# Patient Record
Sex: FEMALE | Race: WHITE | Employment: FULL TIME | URBAN - METROPOLITAN AREA
[De-identification: names, ages, dates, MRNs, and addresses within clinical notes are randomized per-mention and may not be internally consistent; named-entity substitution may affect disease eponyms.]

---

## 2017-01-11 PROCEDURE — 88175 CYTOPATH C/V AUTO FLUID REDO: CPT | Performed by: OBSTETRICS & GYNECOLOGY

## 2017-01-11 PROCEDURE — 87624 HPV HI-RISK TYP POOLED RSLT: CPT | Performed by: OBSTETRICS & GYNECOLOGY

## 2017-02-27 PROCEDURE — 88305 TISSUE EXAM BY PATHOLOGIST: CPT | Performed by: OBSTETRICS & GYNECOLOGY

## 2017-02-28 PROBLEM — M76.51 PATELLAR TENDINITIS, RIGHT KNEE: Status: ACTIVE | Noted: 2017-02-28

## 2017-02-28 PROBLEM — M25.561 ANTERIOR KNEE PAIN, RIGHT: Status: ACTIVE | Noted: 2017-02-28

## 2017-03-08 ENCOUNTER — HOSPITAL ENCOUNTER (EMERGENCY)
Facility: HOSPITAL | Age: 42
Discharge: HOME | End: 2017-03-08
Payer: COMMERCIAL

## 2017-03-08 VITALS
DIASTOLIC BLOOD PRESSURE: 67 MMHG | SYSTOLIC BLOOD PRESSURE: 109 MMHG | HEART RATE: 77 BPM | BODY MASS INDEX: 24 KG/M2 | RESPIRATION RATE: 20 BRPM | WEIGHT: 145 LBS | TEMPERATURE: 98 F | OXYGEN SATURATION: 97 %

## 2017-03-08 DIAGNOSIS — H92.01 OTALGIA, RIGHT: ICD-10-CM

## 2017-03-08 DIAGNOSIS — H61.21 IMPACTED CERUMEN OF RIGHT EAR: Primary | ICD-10-CM

## 2017-03-08 PROCEDURE — 99283 EMERGENCY DEPT VISIT LOW MDM: CPT

## 2017-03-08 NOTE — ED PROVIDER NOTES
Patient Seen in: [facility] Emergency Department    History   Patient presents with:  Ear Problem Pain (neurosensory)    Stated Complaint: R ear pain    HPI    Patient is a 80-year-old presenting to the ER with complaint of sudden onset right ear pa Comment Performed by Miriam Solis at [facility]  1/2015    Comment LEAH 1    COLPOSCOPY, CERVIX W/UPPER ADJACENT VAGINA; W/ENDOCERVICAL CURETTAGE  6/2015    Comment LEAH 1    COLPOSCOPY, CERVIX W/UPPER ADJACENT VAGINA; W/BIOPSY(S), (36.8 °C)   Temp src 03/08/17 0031 Temporal   SpO2 03/08/17 0031 97 %   O2 Device 03/08/17 0031 None (Room air)       Current:/67 mmHg  Pulse 77  Temp(Src) 98.3 °F (36.8 °C) (Temporal)  Resp 20  Wt 65.772 kg  SpO2 97%  LMP 02/16/2017        Physical patient's immediate return to the emergency department. Reasonable over the counter and prescription treatment options and Physician follow up plan was discussed. The patient is discharged home in good condition.              Disposition and Plan     C

## 2017-04-18 PROBLEM — E88.89 HOFFA'S FAT PAD DISEASE (HCC): Status: ACTIVE | Noted: 2017-04-18

## 2017-04-18 PROBLEM — M22.41 CHONDROMALACIA PATELLAE, RIGHT: Status: ACTIVE | Noted: 2017-04-18

## 2017-06-23 PROCEDURE — 36415 COLL VENOUS BLD VENIPUNCTURE: CPT | Performed by: FAMILY MEDICINE

## 2017-06-23 PROCEDURE — 87389 HIV-1 AG W/HIV-1&-2 AB AG IA: CPT | Performed by: FAMILY MEDICINE

## 2017-06-23 PROCEDURE — 86803 HEPATITIS C AB TEST: CPT | Performed by: FAMILY MEDICINE

## 2017-06-23 PROCEDURE — 86592 SYPHILIS TEST NON-TREP QUAL: CPT | Performed by: FAMILY MEDICINE

## 2017-06-23 PROCEDURE — 87491 CHLMYD TRACH DNA AMP PROBE: CPT | Performed by: FAMILY MEDICINE

## 2017-06-23 PROCEDURE — 87591 N.GONORRHOEAE DNA AMP PROB: CPT | Performed by: FAMILY MEDICINE

## 2018-02-01 PROCEDURE — 88175 CYTOPATH C/V AUTO FLUID REDO: CPT | Performed by: OBSTETRICS & GYNECOLOGY

## 2018-02-01 PROCEDURE — 87624 HPV HI-RISK TYP POOLED RSLT: CPT | Performed by: OBSTETRICS & GYNECOLOGY

## 2019-03-05 PROCEDURE — 88175 CYTOPATH C/V AUTO FLUID REDO: CPT | Performed by: OBSTETRICS & GYNECOLOGY

## 2019-03-05 PROCEDURE — 87624 HPV HI-RISK TYP POOLED RSLT: CPT | Performed by: OBSTETRICS & GYNECOLOGY

## 2020-04-13 ENCOUNTER — HOSPITAL ENCOUNTER (OUTPATIENT)
Dept: BEHAVIORAL HEALTH | Age: 45
Discharge: STILL ADMITTED OR ADMINISTRATIVE | End: 2020-04-13
Attending: PSYCHIATRY & NEUROLOGY

## 2020-04-13 DIAGNOSIS — F41.1 GENERALIZED ANXIETY DISORDER: Primary | ICD-10-CM

## 2020-04-13 DIAGNOSIS — F33.42 MAJOR DEPRESSIVE DISORDER, RECURRENT EPISODE, IN FULL REMISSION (CMD): ICD-10-CM

## 2020-04-13 PROCEDURE — 99441 TELEPHONE E&M BY PHYSICIAN EST PT NOT ORIG PREV 7 DAYS 5-10 MIN: CPT | Performed by: PSYCHIATRY & NEUROLOGY

## 2020-04-13 RX ORDER — CITALOPRAM 40 MG/1
TABLET ORAL
Qty: 90 TABLET | Refills: 1 | Status: SHIPPED | OUTPATIENT
Start: 2020-04-13 | End: 2020-09-24 | Stop reason: SDUPTHER

## 2020-04-13 RX ORDER — CITALOPRAM 20 MG/1
TABLET ORAL
Qty: 110 TABLET | Refills: 1 | Status: SHIPPED | OUTPATIENT
Start: 2020-04-13 | End: 2020-09-24 | Stop reason: SDUPTHER

## 2020-04-13 RX ORDER — CITALOPRAM 40 MG/1
TABLET ORAL
COMMUNITY
Start: 2020-04-01 | End: 2020-04-13 | Stop reason: SDUPTHER

## 2020-04-13 RX ORDER — CITALOPRAM 20 MG/1
TABLET ORAL
COMMUNITY
Start: 2020-04-01 | End: 2020-04-13 | Stop reason: SDUPTHER

## 2020-04-13 ASSESSMENT — ENCOUNTER SYMPTOMS
CONSTIPATION: 0
SHORTNESS OF BREATH: 0
SLEEP DISTURBANCE: 0

## 2020-09-23 ENCOUNTER — TELEPHONE (OUTPATIENT)
Dept: BEHAVIORAL HEALTH | Age: 45
End: 2020-09-23

## 2020-09-24 RX ORDER — CITALOPRAM 20 MG/1
TABLET ORAL
Qty: 110 TABLET | Refills: 1 | Status: SHIPPED | OUTPATIENT
Start: 2020-09-24 | End: 2021-02-16 | Stop reason: SDUPTHER

## 2020-09-24 RX ORDER — CITALOPRAM 40 MG/1
TABLET ORAL
Qty: 90 TABLET | Refills: 1 | Status: SHIPPED | OUTPATIENT
Start: 2020-09-24 | End: 2021-03-17

## 2020-09-29 ENCOUNTER — HOSPITAL ENCOUNTER (OUTPATIENT)
Dept: BEHAVIORAL HEALTH | Age: 45
Discharge: STILL ADMITTED OR ADMINISTRATIVE | End: 2020-09-29
Attending: PSYCHIATRY & NEUROLOGY

## 2020-09-29 DIAGNOSIS — F33.1 MAJOR DEPRESSIVE DISORDER, RECURRENT EPISODE, MODERATE (CMD): Primary | ICD-10-CM

## 2020-09-29 DIAGNOSIS — F41.1 GENERALIZED ANXIETY DISORDER: ICD-10-CM

## 2020-09-29 PROCEDURE — 99214 OFFICE O/P EST MOD 30 MIN: CPT | Performed by: PSYCHIATRY & NEUROLOGY

## 2020-09-29 PROCEDURE — 90833 PSYTX W PT W E/M 30 MIN: CPT | Performed by: PSYCHIATRY & NEUROLOGY

## 2020-09-29 ASSESSMENT — ENCOUNTER SYMPTOMS
GASTROINTESTINAL NEGATIVE: 1
SHORTNESS OF BREATH: 0
LIGHT-HEADEDNESS: 0
NERVOUS/ANXIOUS: 1
UNEXPECTED WEIGHT CHANGE: 0
HEMATOLOGIC/LYMPHATIC NEGATIVE: 1
ENDOCRINE NEGATIVE: 1
SLEEP DISTURBANCE: 0
ALLERGIC/IMMUNOLOGIC NEGATIVE: 1

## 2020-10-15 ENCOUNTER — HOSPITAL ENCOUNTER (OUTPATIENT)
Dept: BEHAVIORAL HEALTH | Age: 45
Discharge: STILL ADMITTED OR ADMINISTRATIVE | End: 2020-10-15
Attending: PSYCHIATRY & NEUROLOGY

## 2020-10-15 PROCEDURE — 90791 PSYCH DIAGNOSTIC EVALUATION: CPT | Performed by: NURSE PRACTITIONER

## 2020-10-15 ASSESSMENT — PATIENT HEALTH QUESTIONNAIRE - GENERAL
DURING THAT TIME, DO YOU FEEL LIKE YOU HAD AN INCREASED SELF-ESTEEM, MORE CONFIDENCE OR FEEL LIKE YOU ARE GREATER THAN NORMAL OR OTHERS AROUND YOU?: NO
HAVE THE SYMPTOMS OF (NAME DEPRESSION SYMPTOMS THAT WERE ENDORSED) BEEN PRESENT FOR AT LEAST 2 YEARS (AT LEAST 1 YEAR IN CHILDREN AND ADOLESCENTS): NO
DURING THAT TIME WERE YOU MORE TALKATIVE THAN USUAL OR YOU FEEL PRESSURE TO KEEP TALKING?: NO
DURING THAT TIME DID YOU HAVE AN INCREASE IN WANTING TO ACCOMPLISH MORE THAN NORMAL: NO
DO YOU EVER HAVE TIMES FOR AT LEAST 4 DAYS IN A ROW BUT LESS THAN 1 WEEK WHEN MOST OF THE DAY OR NEARLY EVERY DAY YOU ARE EXPERIENCING INCREASED GOAL-DIRECTED ACTIVITY OR ENERGY?: NO
HAVE THE SYMPTOMS OF (NAME THE HYPOMANIC SYMPTOMS THAT WERE ENDORSED) BEEN PRESENT FOR AT LEAST 2 YEARS (AT LEAST 1 YEAR IN CHILDREN AND ADOLESCENTS): NO
DURING YOUR LIFETIME HAVE EVER YOU FELT DEPRESSED OR SAD MOST DAYS, EVEN IF YOU FELT OKAY SOMETIMES?: YES
DO YOU EVER HAVE TIMES FOR AT LEAST 1 WEEK WHEN MOST OF THE DAY OR NEARLY EVERY DAY YOU ARE EXPERIENCING INCREASED GOAL-DIRECTED ACTIVITY OR ENERGY: NO
DURING THAT TIME DID YOU HAVE A DECREASED NEED FOR SLEEP: NO
DURING THE 2 YEAR PERIOD (1 YEAR FOR CHILDREN AND ADOLESCENTS) HAVE THE SYMPTOMS WE JUST DISCUSSED BEEN PRESENT AT LEAST HALF OF THE TIME?: NO
DO YOU EVER HAVE TIMES THAT FOR AT LEAST 4 DAYS IN A ROW BUT LESS THAN 1 WEEK YOU HAVE ABNORMALLY AND CONTINUOUSLY HIGH, BIG, OR IRRITABLE MOOD: YES
DO YOU EVER HAVE TIMES THAT FOR AT LEAST 1 WEEK YOU HAVE ABNORMALLY AND CONTINUOUSLY HIGH, BIG, OR IRRTABLE MOOD: NO
DURING THAT TIME DID YOU HAVE MORE IDEAS THAN NORMAL OR FEEL LIKE YOUR THOUGHTS ARE RACING: YES
DURING YOUR LIFETIME, HAVE YOU EVER HAD ANOTHER TIME OF 2 WEEKS OR MORE WHEN YOU FELT DEPRESSED OR UNINTERESTED IN MOST THINGS, AND HAD MOST OF THE PROBLEMS WE JUST TALKED ABOUT: YES
DURING THAT TIME DID YOU HAVE EXCESSIVE INVOLVEMENT IN ACTIVITIES THAT ARE MORE RISK TAKING OR HAVE A HIGH POTENTIAL FOR PAINFUL CONSEQUENCES SUCH AS UNRESTRAINED BUYING SPREES, SEXUAL INDISCRETIONS, INCREASED DRUG OR ALCOHOL USE: NO
WAS THERE A TIME OF AT LEAST 2 MONTHS WHERE YOU FELT OK (WITHOUT DEPRESSION OR LOSS OF INTEREST) BETWEEN NOW AND THE LAST TIME YOU FELT DEPRESSED?: YES
DURING THE 2 YEAR PERIOD (1 YEAR FOR CHILDREN AND ADOLESCENTS) HAVE THE SYMPTOMS WE JUST DISCUSSED EVER BEEN ABSENT FOR 2 MONTHS AT A TIME?: NO

## 2020-10-15 ASSESSMENT — PATIENT HEALTH QUESTIONNAIRE - PHQ9
CLINICAL INTERPRETATION OF PHQ2 SCORE: NO FURTHER SCREENING NEEDED
9. THOUGHTS THAT YOU WOULD BE BETTER OFF DEAD, OR OF HURTING YOURSELF: NOT AT ALL
8. MOVING OR SPEAKING SO SLOWLY THAT OTHER PEOPLE COULD HAVE NOTICED. OR THE OPPOSITE, BEING SO FIGETY OR RESTLESS THAT YOU HAVE BEEN MOVING AROUND A LOT MORE THAN USUAL: NOT AT ALL
2. FEELING DOWN, DEPRESSED OR HOPELESS: SEVERAL DAYS
10. IF YOU CHECKED OFF ANY PROBLEMS, HOW DIFFICULT HAVE THESE PROBLEMS MADE IT FOR YOU TO DO YOUR WORK, TAKE CARE OF THINGS AT HOME, OR GET ALONG WITH OTHER PEOPLE: YES
4. FEELING TIRED OR HAVING LITTLE ENERGY: NOT AT ALL
CLINICAL INTERPRETATION OF PHQ2 SCORE: MILD DEPRESSION
SUM OF ALL RESPONSES TO PHQ9 QUESTIONS 1 TO 9: 9
1. LITTLE INTEREST OR PLEASURE IN DOING THINGS: SEVERAL DAYS
SUM OF ALL RESPONSES TO PHQ9 QUESTIONS 1 AND 2: 2
6. FEELING BAD ABOUT YOURSELF - OR THAT YOU ARE A FAILURE OR HAVE LET YOURSELF OR YOUR FAMILY DOWN: MORE THAN HALF THE DAYS
CLINICAL INTERPRETATION OF PHQ9 SCORE: NO FURTHER SCREENING NEEDED
SUM OF ALL RESPONSES TO PHQ QUESTIONS 1-9: 9
5. POOR APPETITE OR OVEREATING: SEVERAL DAYS
SUM OF ALL RESPONSES TO PHQ9 QUESTIONS 1 AND 2: 2
CLINICAL INTERPRETATION OF PHQ9 SCORE: MILD DEPRESSION
3. TROUBLE FALLING OR STAYING ASLEEP OR SLEEPING TOO MUCH: NEARLY EVERY DAY
7. TROUBLE CONCENTRATING ON THINGS, SUCH AS READING THE NEWSPAPER OR WATCHING TELEVISION: SEVERAL DAYS

## 2020-10-26 ENCOUNTER — APPOINTMENT (OUTPATIENT)
Dept: BEHAVIORAL HEALTH | Age: 45
End: 2020-10-26
Attending: PSYCHIATRY & NEUROLOGY

## 2020-11-03 ENCOUNTER — HOSPITAL ENCOUNTER (OUTPATIENT)
Dept: BEHAVIORAL HEALTH | Age: 45
Discharge: STILL ADMITTED OR ADMINISTRATIVE | End: 2020-11-03
Attending: PSYCHIATRY & NEUROLOGY

## 2020-11-03 DIAGNOSIS — F33.42 MAJOR DEPRESSIVE DISORDER, RECURRENT EPISODE, IN FULL REMISSION (CMD): ICD-10-CM

## 2020-11-03 DIAGNOSIS — F41.1 GENERALIZED ANXIETY DISORDER: Primary | ICD-10-CM

## 2020-11-03 PROCEDURE — 99213 OFFICE O/P EST LOW 20 MIN: CPT | Performed by: PSYCHIATRY & NEUROLOGY

## 2020-11-03 ASSESSMENT — ENCOUNTER SYMPTOMS
AGITATION: 0
SHORTNESS OF BREATH: 0
ENDOCRINE NEGATIVE: 1
HEMATOLOGIC/LYMPHATIC NEGATIVE: 1
SLEEP DISTURBANCE: 0
ALLERGIC/IMMUNOLOGIC NEGATIVE: 1
HEADACHES: 0
NERVOUS/ANXIOUS: 1
CONSTIPATION: 0
UNEXPECTED WEIGHT CHANGE: 0

## 2021-02-16 RX ORDER — CITALOPRAM 20 MG/1
TABLET ORAL
Qty: 111 TABLET | Refills: 1 | Status: SHIPPED | OUTPATIENT
Start: 2021-02-16 | End: 2021-08-12 | Stop reason: SDUPTHER

## 2021-03-17 RX ORDER — CITALOPRAM 40 MG/1
TABLET ORAL
Qty: 180 TABLET | Refills: 1 | Status: SHIPPED | OUTPATIENT
Start: 2021-03-17 | End: 2021-08-12 | Stop reason: SDUPTHER

## 2021-08-12 ENCOUNTER — TELEPHONE (OUTPATIENT)
Dept: BEHAVIORAL HEALTH | Age: 46
End: 2021-08-12

## 2021-08-12 RX ORDER — CITALOPRAM 20 MG/1
TABLET ORAL
Qty: 111 TABLET | Refills: 3 | Status: SHIPPED | OUTPATIENT
Start: 2021-08-12

## 2021-08-12 RX ORDER — CITALOPRAM 40 MG/1
TABLET ORAL
Qty: 180 TABLET | Refills: 3 | Status: SHIPPED | OUTPATIENT
Start: 2021-08-12

## 2021-08-17 ENCOUNTER — BEHAVIORAL HEALTH (OUTPATIENT)
Dept: BEHAVIORAL HEALTH | Age: 46
End: 2021-08-17

## 2021-08-17 DIAGNOSIS — F33.1 MAJOR DEPRESSIVE DISORDER, RECURRENT EPISODE, MODERATE (CMD): ICD-10-CM

## 2021-08-17 PROCEDURE — 90833 PSYTX W PT W E/M 30 MIN: CPT | Performed by: NURSE PRACTITIONER

## 2021-08-17 PROCEDURE — 99214 OFFICE O/P EST MOD 30 MIN: CPT | Performed by: NURSE PRACTITIONER

## 2021-12-18 PROBLEM — Z12.39 BREAST CANCER SCREENING, HIGH RISK PATIENT: Status: ACTIVE | Noted: 2021-12-18

## 2022-11-21 ENCOUNTER — APPOINTMENT (RX ONLY)
Dept: URBAN - METROPOLITAN AREA CLINIC 174 | Facility: CLINIC | Age: 47
Setting detail: DERMATOLOGY
End: 2022-11-21

## 2022-11-21 DIAGNOSIS — L81.4 OTHER MELANIN HYPERPIGMENTATION: ICD-10-CM

## 2022-11-21 DIAGNOSIS — L57.8 OTHER SKIN CHANGES DUE TO CHRONIC EXPOSURE TO NONIONIZING RADIATION: ICD-10-CM

## 2022-11-21 DIAGNOSIS — D22 MELANOCYTIC NEVI: ICD-10-CM

## 2022-11-21 DIAGNOSIS — L82.1 OTHER SEBORRHEIC KERATOSIS: ICD-10-CM

## 2022-11-21 DIAGNOSIS — Z71.89 OTHER SPECIFIED COUNSELING: ICD-10-CM

## 2022-11-21 PROBLEM — D22.71 MELANOCYTIC NEVI OF RIGHT LOWER LIMB, INCLUDING HIP: Status: ACTIVE | Noted: 2022-11-21

## 2022-11-21 PROBLEM — D22.5 MELANOCYTIC NEVI OF TRUNK: Status: ACTIVE | Noted: 2022-11-21

## 2022-11-21 PROBLEM — D22.72 MELANOCYTIC NEVI OF LEFT LOWER LIMB, INCLUDING HIP: Status: ACTIVE | Noted: 2022-11-21

## 2022-11-21 PROBLEM — D22.39 MELANOCYTIC NEVI OF OTHER PARTS OF FACE: Status: ACTIVE | Noted: 2022-11-21

## 2022-11-21 PROBLEM — D22.4 MELANOCYTIC NEVI OF SCALP AND NECK: Status: ACTIVE | Noted: 2022-11-21

## 2022-11-21 PROCEDURE — ? COUNSELING

## 2022-11-21 PROCEDURE — 99203 OFFICE O/P NEW LOW 30 MIN: CPT

## 2022-11-21 ASSESSMENT — LOCATION SIMPLE DESCRIPTION DERM
LOCATION SIMPLE: CHEST
LOCATION SIMPLE: ABDOMEN
LOCATION SIMPLE: LEFT KNEE
LOCATION SIMPLE: LEFT CHEEK
LOCATION SIMPLE: LEFT PRETIBIAL REGION
LOCATION SIMPLE: RIGHT THIGH
LOCATION SIMPLE: RIGHT CALF
LOCATION SIMPLE: RIGHT UPPER BACK
LOCATION SIMPLE: LEFT SHOULDER
LOCATION SIMPLE: RIGHT PRETIBIAL REGION
LOCATION SIMPLE: RIGHT SHOULDER
LOCATION SIMPLE: NECK
LOCATION SIMPLE: LEFT FOREARM
LOCATION SIMPLE: LEFT THIGH
LOCATION SIMPLE: RIGHT HAND

## 2022-11-21 ASSESSMENT — LOCATION DETAILED DESCRIPTION DERM
LOCATION DETAILED: RIGHT RADIAL DORSAL HAND
LOCATION DETAILED: RIGHT CENTRAL LATERAL NECK
LOCATION DETAILED: LEFT MEDIAL SUPERIOR CHEST
LOCATION DETAILED: LEFT KNEE
LOCATION DETAILED: MIDDLE STERNUM
LOCATION DETAILED: RIGHT LATERAL PROXIMAL PRETIBIAL REGION
LOCATION DETAILED: LEFT DISTAL DORSAL FOREARM
LOCATION DETAILED: LEFT POSTERIOR SHOULDER
LOCATION DETAILED: RIGHT INFERIOR MEDIAL UPPER BACK
LOCATION DETAILED: LEFT PROXIMAL PRETIBIAL REGION
LOCATION DETAILED: RIGHT ANTERIOR PROXIMAL THIGH
LOCATION DETAILED: RIGHT RIB CAGE
LOCATION DETAILED: LEFT INFERIOR CENTRAL MALAR CHEEK
LOCATION DETAILED: RIGHT PROXIMAL LATERAL CALF
LOCATION DETAILED: RIGHT SUPERIOR UPPER BACK
LOCATION DETAILED: RIGHT POSTERIOR SHOULDER
LOCATION DETAILED: LEFT ANTERIOR PROXIMAL THIGH

## 2022-11-21 ASSESSMENT — LOCATION ZONE DERM
LOCATION ZONE: ARM
LOCATION ZONE: FACE
LOCATION ZONE: TRUNK
LOCATION ZONE: NECK
LOCATION ZONE: HAND
LOCATION ZONE: LEG

## (undated) NOTE — ED AVS SNAPSHOT
[facility] Emergency Department    [place]  [address] [address]    Phone:  [phone]    Fax:  [address] [facility]   MRN: [id number]    Department:  [facility] Emergency Department   Date of Visit:  3/8/ IF THERE IS ANY CHANGE OR WORSENING OF YOUR CONDITION, CALL YOUR PRIMARY CARE PHYSICIAN AT ONCE OR RETURN IMMEDIATELY TO THE EMERGENCY DEPARTMENT.     If you have been prescribed any medication(s), please fill your prescription right away and begin taking t

## (undated) NOTE — ED AVS SNAPSHOT
[facility] Emergency Department    [place]  [address] [place] [address]    Phone:  [phone]    Fax:  [address] [place] [address]   MRN: [id number]    Department:  [facility] Emergency Department   Date of Visit:  3/8/ Or call [phone]    If you have any problems with your follow-up, please call our  at [phone]    Si usted tiene algun problema con mallory sequimiento, por favor llame a nuestro adminstrador de casos al [phone]    Expect to Pharmacy Address Phone Number   [address] ([address]) [place] [address].  ([facility]) [phone]   [facility] Summaries. If you've been to the Emergency Department or your doctor's office, you can view your past visit information in [employer] by going to Visits < Visit Summaries. [employer] questions? Call [phone] for help.   [employer] is NOT to be used for urge